# Patient Record
Sex: FEMALE | Race: WHITE | Employment: UNEMPLOYED | URBAN - METROPOLITAN AREA
[De-identification: names, ages, dates, MRNs, and addresses within clinical notes are randomized per-mention and may not be internally consistent; named-entity substitution may affect disease eponyms.]

---

## 2017-06-12 PROBLEM — O98.419 HEPATITIS C, CHRONIC, MATERNAL, ANTEPARTUM (HCC): Status: ACTIVE | Noted: 2017-06-12

## 2017-06-12 PROBLEM — B00.9 POLYMERASE CHAIN REACTION DNA TEST POSITIVE FOR HERPES SIMPLEX VIRUS TYPE 1 (HSV-1): Status: ACTIVE | Noted: 2017-06-12

## 2017-06-12 PROBLEM — O42.90 PROM (PREMATURE RUPTURE OF MEMBRANES): Status: ACTIVE | Noted: 2017-06-12

## 2017-06-12 PROBLEM — F19.10 IVDA (INTRAVENOUS DRUG ABUSE) COMPLICATING PREGNANCY (HCC): Status: ACTIVE | Noted: 2017-06-12

## 2017-06-12 PROBLEM — O99.320 IVDA (INTRAVENOUS DRUG ABUSE) COMPLICATING PREGNANCY (HCC): Status: ACTIVE | Noted: 2017-06-12

## 2017-06-12 PROBLEM — B18.2 HEPATITIS C, CHRONIC, MATERNAL, ANTEPARTUM (HCC): Status: ACTIVE | Noted: 2017-06-12

## 2022-03-18 PROBLEM — F19.10 IVDA (INTRAVENOUS DRUG ABUSE) COMPLICATING PREGNANCY (HCC): Status: ACTIVE | Noted: 2017-06-12

## 2022-03-18 PROBLEM — O99.320 IVDA (INTRAVENOUS DRUG ABUSE) COMPLICATING PREGNANCY (HCC): Status: ACTIVE | Noted: 2017-06-12

## 2022-03-18 PROBLEM — B00.9 POLYMERASE CHAIN REACTION DNA TEST POSITIVE FOR HERPES SIMPLEX VIRUS TYPE 1 (HSV-1): Status: ACTIVE | Noted: 2017-06-12

## 2022-03-18 PROBLEM — Z11.59 POLYMERASE CHAIN REACTION DNA TEST POSITIVE FOR HERPES SIMPLEX VIRUS TYPE 1 (HSV-1): Status: ACTIVE | Noted: 2017-06-12

## 2022-03-19 PROBLEM — O42.90 PROM (PREMATURE RUPTURE OF MEMBRANES): Status: ACTIVE | Noted: 2017-06-12

## 2022-03-19 PROBLEM — B18.2 HEPATITIS C, CHRONIC, MATERNAL, ANTEPARTUM (HCC): Status: ACTIVE | Noted: 2017-06-12

## 2022-03-19 PROBLEM — O98.419 HEPATITIS C, CHRONIC, MATERNAL, ANTEPARTUM (HCC): Status: ACTIVE | Noted: 2017-06-12

## 2023-02-02 ENCOUNTER — HOSPITAL ENCOUNTER (EMERGENCY)
Age: 33
Discharge: HOME | End: 2023-02-02
Attending: EMERGENCY MEDICINE
Payer: COMMERCIAL

## 2023-02-02 ENCOUNTER — APPOINTMENT (OUTPATIENT)
Dept: CT IMAGING | Age: 33
End: 2023-02-02
Attending: NURSE PRACTITIONER
Payer: COMMERCIAL

## 2023-02-02 VITALS
TEMPERATURE: 99.4 F | BODY MASS INDEX: 17.54 KG/M2 | HEART RATE: 84 BPM | RESPIRATION RATE: 16 BRPM | OXYGEN SATURATION: 95 % | WEIGHT: 99 LBS | HEIGHT: 63 IN | SYSTOLIC BLOOD PRESSURE: 110 MMHG | DIASTOLIC BLOOD PRESSURE: 83 MMHG

## 2023-02-02 DIAGNOSIS — K57.92 DIVERTICULITIS: Primary | ICD-10-CM

## 2023-02-02 LAB
ALBUMIN SERPL-MCNC: 4.3 G/DL (ref 3.5–5)
ALBUMIN/GLOB SERPL: 1.1 (ref 1.1–2.2)
ALP SERPL-CCNC: 48 U/L (ref 45–117)
ALT SERPL-CCNC: 23 U/L (ref 12–78)
AMORPH CRY URNS QL MICRO: ABNORMAL
ANION GAP SERPL CALC-SCNC: 10 MMOL/L (ref 5–15)
APPEARANCE UR: ABNORMAL
AST SERPL-CCNC: 18 U/L (ref 15–37)
BACTERIA URNS QL MICRO: ABNORMAL /HPF
BASOPHILS # BLD: 0 K/UL (ref 0–0.1)
BASOPHILS NFR BLD: 0 % (ref 0–1)
BILIRUB SERPL-MCNC: 0.7 MG/DL (ref 0.2–1)
BILIRUB UR QL: NEGATIVE
BUN SERPL-MCNC: 9 MG/DL (ref 6–20)
BUN/CREAT SERPL: 9 (ref 12–20)
CALCIUM SERPL-MCNC: 9.1 MG/DL (ref 8.5–10.1)
CHLORIDE SERPL-SCNC: 103 MMOL/L (ref 97–108)
CO2 SERPL-SCNC: 28 MMOL/L (ref 21–32)
COLOR UR: ABNORMAL
CREAT SERPL-MCNC: 1.04 MG/DL (ref 0.55–1.02)
DIFFERENTIAL METHOD BLD: ABNORMAL
EOSINOPHIL # BLD: 0.1 K/UL (ref 0–0.4)
EOSINOPHIL NFR BLD: 1 % (ref 0–7)
EPITH CASTS URNS QL MICRO: ABNORMAL /LPF
ERYTHROCYTE [DISTWIDTH] IN BLOOD BY AUTOMATED COUNT: 12.5 % (ref 11.5–14.5)
GLOBULIN SER CALC-MCNC: 3.8 G/DL (ref 2–4)
GLUCOSE SERPL-MCNC: 95 MG/DL (ref 65–100)
GLUCOSE UR STRIP.AUTO-MCNC: NEGATIVE MG/DL
HCG UR QL: NEGATIVE
HCT VFR BLD AUTO: 37.4 % (ref 35–47)
HGB BLD-MCNC: 13 G/DL (ref 11.5–16)
HGB UR QL STRIP: NEGATIVE
IMM GRANULOCYTES # BLD AUTO: 0 K/UL (ref 0–0.04)
IMM GRANULOCYTES NFR BLD AUTO: 0 % (ref 0–0.5)
KETONES UR QL STRIP.AUTO: NEGATIVE MG/DL
LEUKOCYTE ESTERASE UR QL STRIP.AUTO: ABNORMAL
LIPASE SERPL-CCNC: 110 U/L (ref 73–393)
LYMPHOCYTES # BLD: 1.1 K/UL (ref 0.8–3.5)
LYMPHOCYTES NFR BLD: 12 % (ref 12–49)
MCH RBC QN AUTO: 31.6 PG (ref 26–34)
MCHC RBC AUTO-ENTMCNC: 34.8 G/DL (ref 30–36.5)
MCV RBC AUTO: 90.8 FL (ref 80–99)
MONOCYTES # BLD: 0.6 K/UL (ref 0–1)
MONOCYTES NFR BLD: 7 % (ref 5–13)
NEUTS SEG # BLD: 7.8 K/UL (ref 1.8–8)
NEUTS SEG NFR BLD: 80 % (ref 32–75)
NITRITE UR QL STRIP.AUTO: NEGATIVE
NRBC # BLD: 0 K/UL (ref 0–0.01)
NRBC BLD-RTO: 0 PER 100 WBC
PH UR STRIP: 8.5 (ref 5–8)
PLATELET # BLD AUTO: 228 K/UL (ref 150–400)
PMV BLD AUTO: 10.8 FL (ref 8.9–12.9)
POTASSIUM SERPL-SCNC: 3.6 MMOL/L (ref 3.5–5.1)
PROT SERPL-MCNC: 8.1 G/DL (ref 6.4–8.2)
PROT UR STRIP-MCNC: ABNORMAL MG/DL
RBC # BLD AUTO: 4.12 M/UL (ref 3.8–5.2)
RBC #/AREA URNS HPF: ABNORMAL /HPF (ref 0–5)
SODIUM SERPL-SCNC: 141 MMOL/L (ref 136–145)
SP GR UR REFRACTOMETRY: 1.01 (ref 1–1.03)
UROBILINOGEN UR QL STRIP.AUTO: 0.2 EU/DL (ref 0.2–1)
WBC # BLD AUTO: 9.7 K/UL (ref 3.6–11)
WBC URNS QL MICRO: ABNORMAL /HPF (ref 0–4)

## 2023-02-02 PROCEDURE — 74011000636 HC RX REV CODE- 636: Performed by: EMERGENCY MEDICINE

## 2023-02-02 PROCEDURE — 74177 CT ABD & PELVIS W/CONTRAST: CPT

## 2023-02-02 PROCEDURE — 81001 URINALYSIS AUTO W/SCOPE: CPT

## 2023-02-02 PROCEDURE — 85025 COMPLETE CBC W/AUTO DIFF WBC: CPT

## 2023-02-02 PROCEDURE — 80053 COMPREHEN METABOLIC PANEL: CPT

## 2023-02-02 PROCEDURE — 83690 ASSAY OF LIPASE: CPT

## 2023-02-02 PROCEDURE — 81025 URINE PREGNANCY TEST: CPT

## 2023-02-02 PROCEDURE — 99285 EMERGENCY DEPT VISIT HI MDM: CPT

## 2023-02-02 RX ORDER — BUSPIRONE HYDROCHLORIDE 10 MG/1
10 TABLET ORAL 3 TIMES DAILY
COMMUNITY

## 2023-02-02 RX ORDER — AMOXICILLIN AND CLAVULANATE POTASSIUM 875; 125 MG/1; MG/1
1 TABLET, FILM COATED ORAL 2 TIMES DAILY
Qty: 20 TABLET | Refills: 0 | Status: SHIPPED | OUTPATIENT
Start: 2023-02-02 | End: 2023-02-12

## 2023-02-02 RX ORDER — AMOXICILLIN AND CLAVULANATE POTASSIUM 875; 125 MG/1; MG/1
1 TABLET, FILM COATED ORAL 2 TIMES DAILY
Qty: 20 TABLET | Refills: 0 | Status: SHIPPED | OUTPATIENT
Start: 2023-02-02 | End: 2023-02-02 | Stop reason: SDUPTHER

## 2023-02-02 RX ORDER — HYDROCODONE BITARTRATE AND ACETAMINOPHEN 5; 325 MG/1; MG/1
1 TABLET ORAL
Qty: 12 TABLET | Refills: 0 | Status: SHIPPED | OUTPATIENT
Start: 2023-02-02 | End: 2023-02-02 | Stop reason: SDUPTHER

## 2023-02-02 RX ORDER — HYDROCODONE BITARTRATE AND ACETAMINOPHEN 5; 325 MG/1; MG/1
1 TABLET ORAL
Qty: 12 TABLET | Refills: 0 | Status: SHIPPED | OUTPATIENT
Start: 2023-02-02 | End: 2023-02-05

## 2023-02-02 RX ADMIN — IOPAMIDOL 100 ML: 755 INJECTION, SOLUTION INTRAVENOUS at 19:28

## 2023-02-02 NOTE — ED TRIAGE NOTES
Patient presents ambulatory with c/o abdominal pain x 3 days. When it started, she experienced vomiting/diarrhea. Those symptoms have since resolved and she has middle abdominal pain. Denies other symptoms. Her OB did blood work, urine, and ultrasound which were inconclusive so they sent her to the ED to be evaluated.    She states \"I took a medication that they gave me after my , started with oxy something\" prior to arrival.

## 2023-02-02 NOTE — Clinical Note
[facility]  [address]  [address]  [phone]    Work/School Note    Date: 2/2/2023    To Whom It May concern:    Clotilde Blum was seen and treated today in the emergency room by the following provider(s):  Attending Provider: Mary Lambert MD  Nurse Practitioner: Dari Fernandes NP. Clotilde Blum is excused from work/school on 2/2/2023 through 2/4/2023. She is medically clear to return to work/school on 2/5/2023.          Sincerely,          Shannan Jain NP

## 2023-02-02 NOTE — Clinical Note
[facility]  [address]  [address]  [phone]    Work/School Note    Date: 2/2/2023    To Whom It May concern:    Jeremy Izaguirre was seen and treated today in the emergency room by the following provider(s):  Attending Provider: Sreekanth Chavez MD  Nurse Practitioner: Matt Vega NP. Jeremy Izaguirre is excused from work/school on 2/2/2023 through 2/4/2023. She is medically clear to return to work/school on 2/5/2023.          Sincerely,          Jami Gonzalez

## 2023-02-03 NOTE — ED NOTES
Patient called back, pharmacy is closed, asked me to move prescriptions to another pharmacy and this was done.

## 2023-02-03 NOTE — ED PROVIDER NOTES
HPI     Past Medical History:   Diagnosis Date    Anemia     Disease of blood and blood forming organ 2010    Hepatitis C diagnosed with prenatal labs    Genital herpes 12/2016    Diagnosed, pt denies ever having a lesion. Declined Valtrex prophylaxis    Hepatitis C, chronic, maternal, antepartum ([facility] [address] 6/12/2017    IVDA (intravenous drug abuse) complicating pregnancy ([facility] [address] 6/12/2017    Polymerase chain reaction DNA test positive for herpes simplex virus type 1 (HSV-1) 6/12/2017       History reviewed. No pertinent surgical history. Family History:   Problem Relation Age of Onset    Psychiatric Disorder Father     Hypertension Father     Stroke Maternal Grandfather        Social History     Socioeconomic History    Marital status:      Spouse name: Not on file    Number of children: Not on file    Years of education: Not on file    Highest education level: Not on file   Occupational History    Not on file   Tobacco Use    Smoking status: Never    Smokeless tobacco: Never   Substance and Sexual Activity    Alcohol use: Yes     Comment: occasional    Drug use: No    Sexual activity: Yes     Partners: Male   Other Topics Concern    Not on file   Social History Narrative    Not on file     Social Determinants of Health     Financial Resource Strain: Not on file   Food Insecurity: Not on file   Transportation Needs: Not on file   Physical Activity: Not on file   Stress: Not on file   Social Connections: Not on file   Intimate Partner Violence: Not on file   Housing Stability: Not on file         ALLERGIES: Patient has no known allergies. Review of Systems    Vitals:    02/02/23 1625   BP: 104/65   Pulse: 83   Resp: 16   Temp: 99.4 °F (37.4 °C)   SpO2: 99%   Weight: 44.9 kg (99 lb)   Height: 5' 3\" (1.6 m)            Physical Exam     Medical Decision Making  Amount and/or Complexity of Data Reviewed  Labs: ordered. Radiology: ordered. Risk  Prescription drug management.          Labs Reviewed   URINALYSIS W/MICROSCOPIC - Abnormal; Notable for the following components:       Result Value    Appearance HAZY (*)     pH (UA) 8.5 (*)     Protein TRACE (*)     Leukocyte Esterase TRACE (*)     Epithelial cells MODERATE (*)     Bacteria 1+ (*)     Amorphous Crystals 2+ (*)     All other components within normal limits   CBC WITH AUTOMATED DIFF - Abnormal; Notable for the following components:    NEUTROPHILS 80 (*)     All other components within normal limits   METABOLIC PANEL, COMPREHENSIVE - Abnormal; Notable for the following components:    Creatinine 1.04 (*)     BUN/Creatinine ratio 9 (*)     All other components within normal limits   LIPASE   SAMPLES BEING HELD   HCG URINE, QL. - POC       CT ABD PELV W CONT    Result Date: 2/2/2023  1. Wall thickening and submucosal enhancement of the descending and sigmoid colon may be related to acute diverticulitis versus infectious/inflammatory colitis. 2.  Small focus of mural outpouching with wall thickening in the sigmoid colon may represent an inflamed diverticulum versus mural abscess. 8:06 PM  Pt has been reexamined. Pt has no new complaints, changes or physical findings. Care plan outlined and precautions discussed. All available results were reviewed with pt. All medications were reviewed with pt. All of pt's questions and concerns were addressed. Pt agrees to F/U as instructed and agrees to return to ED upon further deterioration. Pt is ready to go home. Vel Cam NP    Please note that this dictation was completed with [employer], the computer voice recognition software. Quite often unanticipated grammatical, syntax, homophones, and other interpretive errors are inadvertently transcribed by the computer software. Please disregard these errors. Please excuse any errors that have escaped final proofreading. Thank you.     Procedures gallop. Pulmonary:      Effort: Pulmonary effort is normal. No respiratory distress. Breath sounds: Normal breath sounds. Chest:      Chest wall: No tenderness. Abdominal:      General: Bowel sounds are normal. There is no distension. Palpations: Abdomen is soft. Tenderness: There is generalized abdominal tenderness. There is no guarding or rebound. Hernia: No hernia is present. Genitourinary:     Comments: Negative    Musculoskeletal:         General: No tenderness. Normal range of motion. Cervical back: Normal range of motion and neck supple. Skin:     General: Skin is warm and dry. Capillary Refill: Capillary refill takes less than 2 seconds. Coloration: Skin is not pale. Findings: No erythema or rash. Neurological:      General: No focal deficit present. Mental Status: She is alert and oriented to person, place, and time. Motor: No weakness. Coordination: Coordination normal.   Psychiatric:         Mood and Affect: Mood normal.         Behavior: Behavior normal.         Thought Content: Thought content normal.         Judgment: Judgment normal.        Medical Decision Making  Differential diagnosis includes: gastroenteritis, urinary tract infection, diverticulitis and others. This is a 28year old female  who presents to the emergency room with complaints of abdominal pain, nausea and vomiting as well as episodes of diarrhea. After initial assessment the following was completed:    1. Previous notes (external to Emergency room) were reviewed: chart review, OB gyn notes    2. History was obtained by: patient    3. Chronic medical issues affecting this visit: Past Medical History:  No date: Anemia  2010: Disease of blood and blood forming organ      Comment:  Hepatitis C diagnosed with prenatal labs  12/2016: Genital herpes      Comment:  Diagnosed, pt denies ever having a lesion.  Declined                Valtrex prophylaxis  6/12/2017: [facility]  6/12/2017: IVDA (intravenous drug abuse) complicating pregnancy ([facility]  6/12/2017: Polymerase chain reaction DNA test positive for herpes   simplex virus type 1 (HSV-1)  History reviewed. No pertinent surgical history. 4. I ordered the following tests, followed by review of final reads by lab and radiology: CBC, cmp, lipase, urinalysis, preg test, ct abd and pelvis    5. I considered ordering: as above    6. Any intervention/ surgery needed: After physical assessment and review of lab data and imaging and then reassessmentit was deemed no intervention was required surgically. Po antibiotics and pain control. 7. Social determinants of health: none    8 Final diagnosis: diverticulitis. Medications prescribed: percocet and augmentin    9. Disposition: discharge to home and follow up with PCP, GI as an outpatient      Problems Addressed:  Diverticulitis: acute illness or injury    Amount and/or Complexity of Data Reviewed  External Data Reviewed: notes. Labs: ordered. Decision-making details documented in ED Course. Radiology: ordered. Decision-making details documented in ED Course. Risk  Prescription drug management.          Labs Reviewed   URINALYSIS W/MICROSCOPIC - Abnormal; Notable for the following components:       Result Value    Appearance HAZY (*)     pH (UA) 8.5 (*)     Protein TRACE (*)     Leukocyte Esterase TRACE (*)     Epithelial cells MODERATE (*)     Bacteria 1+ (*)     Amorphous Crystals 2+ (*)     All other components within normal limits   CBC WITH AUTOMATED DIFF - Abnormal; Notable for the following components:    NEUTROPHILS 80 (*)     All other components within normal limits   METABOLIC PANEL, COMPREHENSIVE - Abnormal; Notable for the following components:    Creatinine 1.04 (*)     BUN/Creatinine ratio 9 (*)     All other components within normal limits   LIPASE   SAMPLES BEING HELD   HCG URINE, QL. - POC       CT ABD PELV W CONT    Result Date: 2/2/2023  1. Wall thickening and submucosal enhancement of the descending and sigmoid colon may be related to acute diverticulitis versus infectious/inflammatory colitis. 2.  Small focus of mural outpouching with wall thickening in the sigmoid colon may represent an inflamed diverticulum versus mural abscess. 8:06 PM  Pt has been reexamined. Pt has no new complaints, changes or physical findings. Care plan outlined and precautions discussed. All available results were reviewed with pt. All medications were reviewed with pt. All of pt's questions and concerns were addressed. Pt agrees to F/U as instructed and agrees to return to ED upon further deterioration. Pt is ready to go home. Liborio Beltran NP    Please note that this dictation was completed with [employer], the computer voice recognition software. Quite often unanticipated grammatical, syntax, homophones, and other interpretive errors are inadvertently transcribed by the computer software. Please disregard these errors. Please excuse any errors that have escaped final proofreading. Thank you. Procedures             I did not personally see and evaluate this patient but was available for consultation by DERRICK.     Brigido Medeiros MD

## 2024-11-02 ENCOUNTER — APPOINTMENT (OUTPATIENT)
Facility: HOSPITAL | Age: 34
End: 2024-11-02
Payer: COMMERCIAL

## 2024-11-02 ENCOUNTER — HOSPITAL ENCOUNTER (EMERGENCY)
Facility: HOSPITAL | Age: 34
Discharge: HOME | End: 2024-11-02
Attending: EMERGENCY MEDICINE
Payer: COMMERCIAL

## 2024-11-02 VITALS
OXYGEN SATURATION: 99 % | DIASTOLIC BLOOD PRESSURE: 65 MMHG | RESPIRATION RATE: 16 BRPM | HEIGHT: 60 IN | WEIGHT: 110.23 LBS | SYSTOLIC BLOOD PRESSURE: 111 MMHG | BODY MASS INDEX: 21.64 KG/M2 | TEMPERATURE: 98.4 F | HEART RATE: 62 BPM

## 2024-11-02 DIAGNOSIS — R07.9 CHEST PAIN, UNSPECIFIED TYPE: Primary | ICD-10-CM

## 2024-11-02 LAB
ANION GAP SERPL CALC-SCNC: 11 MMOL/L (ref 2–12)
BASOPHILS # BLD: 0 K/UL (ref 0–0.1)
BASOPHILS NFR BLD: 0 % (ref 0–1)
BUN SERPL-MCNC: 9 MG/DL (ref 6–20)
BUN/CREAT SERPL: 12 (ref 12–20)
CALCIUM SERPL-MCNC: 8.9 MG/DL (ref 8.5–10.1)
CHLORIDE SERPL-SCNC: 103 MMOL/L (ref 97–108)
CO2 SERPL-SCNC: 26 MMOL/L (ref 21–32)
COMMENT:: NORMAL
CREAT SERPL-MCNC: 0.75 MG/DL (ref 0.55–1.02)
DIFFERENTIAL METHOD BLD: ABNORMAL
EOSINOPHIL # BLD: 0.1 K/UL (ref 0–0.4)
EOSINOPHIL NFR BLD: 1 % (ref 0–7)
ERYTHROCYTE [DISTWIDTH] IN BLOOD BY AUTOMATED COUNT: 12.8 % (ref 11.5–14.5)
GLUCOSE SERPL-MCNC: 82 MG/DL (ref 65–100)
HCT VFR BLD AUTO: 35.2 % (ref 35–47)
HETEROPH AB BLD QL IA: NEGATIVE
HGB BLD-MCNC: 12.2 G/DL (ref 11.5–16)
IMM GRANULOCYTES # BLD AUTO: 0 K/UL (ref 0–0.04)
IMM GRANULOCYTES NFR BLD AUTO: 0 % (ref 0–0.5)
LYMPHOCYTES # BLD: 1.6 K/UL (ref 0.8–3.5)
LYMPHOCYTES NFR BLD: 23 % (ref 12–49)
MCH RBC QN AUTO: 32.7 PG (ref 26–34)
MCHC RBC AUTO-ENTMCNC: 34.7 G/DL (ref 30–36.5)
MCV RBC AUTO: 94.4 FL (ref 80–99)
MONOCYTES # BLD: 0.4 K/UL (ref 0–1)
MONOCYTES NFR BLD: 6 % (ref 5–13)
NEUTS SEG # BLD: 4.9 K/UL (ref 1.8–8)
NEUTS SEG NFR BLD: 70 % (ref 32–75)
NRBC # BLD: 0 K/UL (ref 0–0.01)
NRBC BLD-RTO: 0 PER 100 WBC
PLATELET # BLD AUTO: 206 K/UL (ref 150–400)
PMV BLD AUTO: 10.3 FL (ref 8.9–12.9)
POTASSIUM SERPL-SCNC: 3.2 MMOL/L (ref 3.5–5.1)
RBC # BLD AUTO: 3.73 M/UL (ref 3.8–5.2)
SODIUM SERPL-SCNC: 140 MMOL/L (ref 136–145)
SPECIMEN HOLD: NORMAL
TROPONIN I SERPL HS-MCNC: 7 NG/L (ref 0–51)
WBC # BLD AUTO: 7.1 K/UL (ref 3.6–11)

## 2024-11-02 PROCEDURE — 36415 COLL VENOUS BLD VENIPUNCTURE: CPT

## 2024-11-02 PROCEDURE — 85025 COMPLETE CBC W/AUTO DIFF WBC: CPT

## 2024-11-02 PROCEDURE — 86308 HETEROPHILE ANTIBODY SCREEN: CPT

## 2024-11-02 PROCEDURE — 80048 BASIC METABOLIC PNL TOTAL CA: CPT

## 2024-11-02 PROCEDURE — 99285 EMERGENCY DEPT VISIT HI MDM: CPT

## 2024-11-02 PROCEDURE — 71046 X-RAY EXAM CHEST 2 VIEWS: CPT

## 2024-11-02 PROCEDURE — 84484 ASSAY OF TROPONIN QUANT: CPT

## 2024-11-02 RX ORDER — POTASSIUM CHLORIDE 1500 MG/1
20 TABLET, EXTENDED RELEASE ORAL 2 TIMES DAILY
Qty: 6 TABLET | Refills: 0 | Status: SHIPPED | OUTPATIENT
Start: 2024-11-02 | End: 2024-11-05

## 2024-11-02 RX ORDER — POTASSIUM CHLORIDE 1500 MG/1
20 TABLET, EXTENDED RELEASE ORAL 2 TIMES DAILY
Qty: 6 TABLET | Refills: 0 | Status: SHIPPED
Start: 2024-11-02 | End: 2024-11-02

## 2024-11-02 ASSESSMENT — ENCOUNTER SYMPTOMS
COUGH: 0
NAUSEA: 0
SHORTNESS OF BREATH: 1
BACK PAIN: 0
VOMITING: 0
SORE THROAT: 1

## 2024-11-02 ASSESSMENT — PAIN - FUNCTIONAL ASSESSMENT: PAIN_FUNCTIONAL_ASSESSMENT: NONE - DENIES PAIN

## 2024-11-02 NOTE — ED PROVIDER NOTES
[facility]  EMERGENCY DEPARTMENT ENCOUNTER      Pt Name: Whitney Barboza  MRN: [id number]  Birthdate 1990  Date of evaluation: 11/2/2024  Provider: MARISSA Motley    CHIEF COMPLAINT       Chief Complaint   Patient presents with    Chest Pain         HISTORY OF PRESENT ILLNESS   (Location/Symptom, Timing/Onset, Context/Setting, Quality, Duration, Modifying Factors, Severity)  Note limiting factors.   35 y/o female presenting with complaint of chest pain.  The patient states that she has had a constant sore throat for the past month, followed by onset of midline chest pain 3 days ago.  The chest pain is intermittent, not elicited by activity, change in position or eating.  She denies fevers, diaphoresis, cough, nausea, vomiting, lightheadedness or syncope. No recent surgeries/hospitalizations, long travel, oral contraceptives, history of malignancy, hemoptysis, asymmetrical leg pain/swelling, or previous DVT/PE.     The history is provided by the patient.         Review of External Medical Records:     Nursing Notes were reviewed.    REVIEW OF SYSTEMS    (2-9 systems for level 4, 10 or more for level 5)     Review of Systems   Constitutional:  Negative for chills, diaphoresis and fever.   HENT:  Positive for congestion (baseline) and sore throat.    Respiratory:  Positive for shortness of breath (tightness). Negative for cough.    Cardiovascular:  Positive for chest pain. Negative for leg swelling.   Gastrointestinal:  Negative for nausea and vomiting.   Musculoskeletal:  Negative for back pain.   Neurological:  Negative for syncope and light-headedness.       Except as noted above the remainder of the review of systems was reviewed and negative.       PAST MEDICAL HISTORY     Past Medical History:   Diagnosis Date    Anemia     Disease of blood and blood forming organ 2010    Hepatitis C diagnosed with prenatal labs    Genital herpes 12/2016    Diagnosed, pt denies ever having a lesion. Declined

## 2024-11-02 NOTE — ED TRIAGE NOTES
Patient arrives with c/o chest pain and sob for the last 3 days. Patient also reports sore throat for a month., reports she's experienced chest pain before and was told it was due to anxiety but patient is not convinced this is due to anxiety.

## 2024-11-02 NOTE — ED NOTES
I have reviewed discharge instructions with the patient.  The patient verbalized understanding.    Patient ambulated out of the emergency department without assistance.  Patient is in no apparent distress.  
09-Mar-2022

## 2024-11-04 LAB
EKG ATRIAL RATE: 69 BPM
EKG DIAGNOSIS: NORMAL
EKG P AXIS: 61 DEGREES
EKG P-R INTERVAL: 136 MS
EKG Q-T INTERVAL: 400 MS
EKG QRS DURATION: 70 MS
EKG QTC CALCULATION (BAZETT): 428 MS
EKG R AXIS: 60 DEGREES
EKG T AXIS: 55 DEGREES
EKG VENTRICULAR RATE: 69 BPM